# Patient Record
Sex: FEMALE | Race: BLACK OR AFRICAN AMERICAN | Employment: FULL TIME | URBAN - METROPOLITAN AREA
[De-identification: names, ages, dates, MRNs, and addresses within clinical notes are randomized per-mention and may not be internally consistent; named-entity substitution may affect disease eponyms.]

---

## 2017-03-29 ENCOUNTER — HOSPITAL ENCOUNTER (OUTPATIENT)
Dept: LAB | Age: 42
Discharge: HOME | End: 2017-03-29
Payer: COMMERCIAL

## 2017-03-29 ENCOUNTER — OFFICE VISIT (OUTPATIENT)
Dept: FAMILY MEDICINE CLINIC | Age: 42
End: 2017-03-29

## 2017-03-29 VITALS
HEART RATE: 74 BPM | WEIGHT: 293 LBS | BODY MASS INDEX: 43.4 KG/M2 | SYSTOLIC BLOOD PRESSURE: 118 MMHG | RESPIRATION RATE: 17 BRPM | HEIGHT: 69 IN | TEMPERATURE: 98.7 F | DIASTOLIC BLOOD PRESSURE: 75 MMHG | OXYGEN SATURATION: 95 %

## 2017-03-29 DIAGNOSIS — Z97.5 IUD (INTRAUTERINE DEVICE) IN PLACE: ICD-10-CM

## 2017-03-29 DIAGNOSIS — D21.9 LEIOMYOMA: ICD-10-CM

## 2017-03-29 DIAGNOSIS — Z01.419 WELL WOMAN EXAM WITH ROUTINE GYNECOLOGICAL EXAM: ICD-10-CM

## 2017-03-29 DIAGNOSIS — Z01.419 WELL WOMAN EXAM WITH ROUTINE GYNECOLOGICAL EXAM: Primary | ICD-10-CM

## 2017-03-29 PROCEDURE — 88175 CYTOPATH C/V AUTO FLUID REDO: CPT | Performed by: FAMILY MEDICINE

## 2017-03-29 PROCEDURE — 87624 HPV HI-RISK TYP POOLED RSLT: CPT | Performed by: FAMILY MEDICINE

## 2017-03-29 NOTE — MR AVS SNAPSHOT
Visit Information Date & Time Provider Department Dept. Phone Encounter #  
 3/29/2017  2:20 PM Alyssa Chaudhry Swengel [phone] [id number] Upcoming Health Maintenance Date Due DTaP/Tdap/Td series (1 - Tdap) 9/18/1998 INFLUENZA AGE 9 TO ADULT 8/1/2016 PAP AKA CERVICAL CYTOLOGY 7/14/2018 Allergies as of 3/29/2017  Review Complete On: 3/29/2017 By: Pat Plaza LPN No Known Allergies Current Immunizations  Reviewed on 1/16/2013 Name Date Influenza Vaccine PF 10/30/2012 Not reviewed this visit You Were Diagnosed With   
  
 Codes Comments Well woman exam with routine gynecological exam    -  Primary ICD-10-CM: X89.753 ICD-9-CM: V72.31   
 IUD (intrauterine device) in place     ICD-10-CM: Z97.5 ICD-9-CM: V45.51 Leiomyoma     ICD-10-CM: D21.9 ICD-9-CM: 215.9 Vitals BP Pulse Temp Resp Height(growth percentile) Weight(growth percentile) 118/75 (BP 1 Location: Right arm, BP Patient Position: Sitting) 74 98.7 °F (37.1 °C) (Oral) 17 5' 8.5\" (1.74 m) 344 lb (156 kg) LMP SpO2 BMI OB Status Smoking Status 03/26/2017 95% 51.54 kg/m2 Having regular periods Never Smoker Vitals History BMI and BSA Data Body Mass Index Body Surface Area 51.54 kg/m 2 2.75 m 2 Preferred Pharmacy Pharmacy Name Phone [facility]Y #[id number]- [place], [address] ([address]) [phone] Your Updated Medication List  
  
   
This list is accurate as of: 3/29/17  3:34 PM.  Always use your most recent med list.  
  
  
  
  
 DAILY MULTI-VITAMINS/IRON tablet Generic drug:  multivitamin with iron Take 1 Tab by mouth daily. levonorgestrel 20 mcg/24 hr (5 years) IUD Commonly known as:  MIRENA  
1 Each by IntraUTERine route once. We Performed the Following REFERRAL TO GYNECOLOGY [REF30 Custom] Comments: Please evaluate patient for fibroid uterus, IUD retention Referral Information Referral ID Referred By Referred To  
  
 [id number] Fortino Joel MD   
   [address] Phone: [phone] Fax: [phone] Visits Status Start Date End Date 1 New Request 3/29/17 3/29/18 If your referral has a status of pending review or denied, additional information will be sent to support the outcome of this decision. Introducing \[facility] [place] [facility]\"" & HEALTH SERVICES! Foziapreethi Priest introduces [facility] patient portal. Now you can access parts of your medical record, email your doctor's office, and request medication refills online. 1. In your internet browser, go to [email or web address]://[facility]. [employer]/[facility] 2. Click on the First Time User? Click Here link in the Sign In box. You will see the New Member Sign Up page. 3. Enter your [facility] Access Code exactly as it appears below. You will not need to use this code after youve completed the sign-up process. If you do not sign up before the expiration date, you must request a new code. · [facility] Access Code: [email or web address] Expires: 6/27/2017  3:34 PM 
 
4. Enter the last four digits of your Social Security Number (xxxx) and Date of Birth (mm/dd/yyyy) as indicated and click Submit. You will be taken to the next sign-up page. 5. Create a [employer] ID. This will be your [facility] login ID and cannot be changed, so think of one that is secure and easy to remember. 6. Create a [facility] password. You can change your password at any time. 7. Enter your Password Reset Question and Answer. This can be used at a later time if you forget your password. 8. Enter your e-mail address. You will receive e-mail notification when new information is available in [address] 9. Click Sign Up. You can now view and download portions of your medical record. 10. Click the Download Summary menu link to download a portable copy of your medical information. If you have questions, please visit the Frequently Asked Questions section of the [facility] website. Remember, [facility] is NOT to be used for urgent needs. For medical emergencies, dial 911. Now available from your iPhone and Android! Please provide this summary of care documentation to your next provider. Your primary care clinician is listed as [address] [place] [address]. If you have any questions after today's visit, please call [phone].

## 2017-03-29 NOTE — LETTER
4/6/2017 9:50 AM 
 
Ms. [address] [facility] [address] [address] Dear Jose Roberto Ramírez: 
 
Please find your most recent results below. Resulted Orders CX-VAG CYTOLOGY Narrative [address]  [place] 
[facility]      [address]         [address] [facility], [facility] [address]      [place], [address]     Willy Corrigan, [address] [facility] 
[phone] Fax# [phone]    Fax# [phone]      Fax# [phone] 
 
========================================================================== 
                       * * * CYTOPATHOLOGY REPORT* * *   
========================================================================== 
GYNECOLOGICAL * * *Procedures/Addenda Present * * * Patient:  Joseph Noel             Specimen #:  [id number] Age:  1975 (Age: 46)                Date of Procedure: 3/29/2017 Sex:  F                                  Date of Receipt:  3/31/2017 Hospital#:  [id number]               Date of Report: 4/5/2017 Med. Record #:  [id number] Location:  [facility]) Physician(s):  Kayla Leavitt MD 
 
   
 
 
 
ADDITIONAL PATIENT INFORMATION:  
RFX 12 , 25 / 39 HPV REGARDLESS:  
YES  
SOURCE: 
A: Cervical/Endocervical Imaged Processed Thin Prep 
 
 
============================================================================ 
                                * * * FINAL INTERPRETATION* * * Cervical/Endocervical Imaged Processed Thin Prep Satisfactory for evaluation. NEGATIVE FOR INTRAEPITHELIAL LESION OR MALIGNANCY. Note:  Due to technical imaging issues or the physical properties of the 
slide/specimen, comprehensive manual rescreening was performed. NICK Ferraro Junior (AS [place] HPV HR Interpretation Test: HPV, high-risk Result: NEGATIVE Reference Interval: Negative This high-risk HPV test detects fourteen high-risk types 
(16/18/31/33/35/39/45/51/52/56/58/59/66/68) without differentiation, using 
nucleic acid amplification method. Test performed by: [address] [place] [address] [place]  Dr. Belgica Alexandre [facility], Laurence Marcelino [facility] Phone number:  [phone] Ricci Russell * * *Electronically Signed* * * 
4/5/2017 ICD10 Codes: 
 O53.774 The Pap test is a screening procedure to aid in the detection of cervical 
cancer and its precursors. It should not be used as the sole means of 
detecting cervical cancer. As with any laboratory test, false negative 
and false positive results are known to occur. RECOMMENDATIONS: 
None. Keep up the good work! Negative PAP smear Negative HPV HR  
    
   
 
 
 
Please call me if you have any questions: [phone] Sincerely, 
Anusha Moore

## 2017-03-29 NOTE — PROGRESS NOTES
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 Lydia Kearney for ultrasound and ? Office removal of IUD    Subjective:   46 y.o. female for Well Woman Check. Patient's last menstrual period was 03/26/2017. Social History: not sexually active. Pertinent past medical hstory:   Patient Active Problem List   Diagnosis Code    BMI 50.0-59.9, adult ([facility] L82.84    IUD (intrauterine device) in place Z97.5     Current Outpatient Prescriptions   Medication Sig Dispense Refill    levonorgestrel (MIRENA) 20 mcg/24 hr (5 years) IUD 1 Each by IntraUTERine route once.  multivitamin with iron (DAILY MULTI-VITAMINS/IRON) tablet Take 1 Tab by mouth daily. No Known Allergies     ROS:  Feeling well. No dyspnea or chest pain on exertion. No abdominal pain, change in bowel habits, black or bloody stools. No urinary tract symptoms. GYN ROS: normal menses, no abnormal bleeding, pelvic pain or discharge, no breast pain or new or enlarging lumps on self exam. No neurological complaints. Objective:     Visit Vitals    /75 (BP 1 Location: Right arm, BP Patient Position: Sitting)    Pulse 74    Temp 98.7 °F (37.1 °C) (Oral)    Resp 17    Ht 5' 8.5\" (1.74 m)    Wt 344 lb (156 kg)    LMP 03/26/2017    SpO2 95%    BMI 51.54 kg/m2     The patient appears well, alert, oriented x 3, in no distress. ENT normal.  Neck supple. No adenopathy or thyromegaly. TIM.  Lungs are clear, good air entry, no wheezes, rhonchi or rales. S1 and S2 normal, no murmurs, regular rate and rhythm. Abdomen soft without tenderness, guarding, mass or organomegaly. Extremities show no edema, normal peripheral pulses. Neurological is normal, no focal findings. BREAST EXAM: breasts appear normal, no suspicious masses, no skin or nipple changes or axillary nodes    PELVIC EXAM: normal external genitalia, vulva, vagina, cervix, uterus and adnexa, exam limited by body habitus, IUD strings visible at cervical os but with gentle traction unable to remove IUD    Assessment/Plan:   well woman  pap smear  return annually or prn    ICD-10-CM ICD-9-CM    1. Well woman exam with routine gynecological exam C03.786 P35.41 METABOLIC PANEL, COMPREHENSIVE      LIPID PANEL      CBC W/O DIFF      TSH 3RD GENERATION      PAP IG, APTIMA HPV AND RFX 16/18,45 ([id number])   2. IUD (intrauterine device) in place Z97.5 V45.51 REFERRAL TO GYNECOLOGY   3. Leiomyoma D21.9 215.9 REFERRAL TO GYNECOLOGY   4. Well woman exam with routine gynecological exam X96.227 X55.48 METABOLIC PANEL, COMPREHENSIVE      LIPID PANEL      CBC W/O DIFF      TSH 3RD GENERATION      PAP IG, APTIMA HPV AND RFX 16/18,45 ([id number])    [V72.31]   .

## 2017-05-16 ENCOUNTER — OFFICE VISIT (OUTPATIENT)
Dept: MIDWIFE SERVICES | Age: 42
End: 2017-05-16

## 2017-05-16 VITALS
SYSTOLIC BLOOD PRESSURE: 136 MMHG | WEIGHT: 293 LBS | HEIGHT: 69 IN | HEART RATE: 79 BPM | DIASTOLIC BLOOD PRESSURE: 95 MMHG | BODY MASS INDEX: 43.4 KG/M2

## 2017-05-16 DIAGNOSIS — D25.1 FIBROIDS, INTRAMURAL: Primary | ICD-10-CM

## 2017-05-16 NOTE — PROGRESS NOTES
Chief Complaint   Patient presents with    Ultrasound     for fibroid evaluation    Other     look for \"stuck\" IUD     Visit Vitals    BP (!) 136/95    Pulse 79    Ht 5' 8.5\" (1.74 m)    Wt 337 lb (152.9 kg)    BMI 50.5 kg/m2     1. Have you been to the ER, urgent care clinic since your last visit? Hospitalized since your last visit? No    2. Have you seen or consulted any other health care providers outside of the [address] [place] [address] since your last visit? Include any pap smears or colon screening. No     Here today for ultrasound to check fibroids and also see if IUD can be removed. Dr. Rei Thorne was unable to remove it.

## 2017-05-27 NOTE — PROGRESS NOTES
Gyn Report [facility] Page 1/ [facility]  Patient / Exam Information Date of Exam: 2017  Name: Lianet Noe. Phys: Sinan ROMAN  Patient ID: [id number] : 1975 Ref. Phys:  2nd Pat. ID: Age: 46 Sonographer: Sinan ROMAN  Indication: Sex: Female Exam Type: FIBROID EVALUATION  LMP  LMP Day of Cycle  AB  Day of stim. Expected Ovul. Para Ectopic  2D Measurements Value m1 m2 m3 m4 m5 m6 Meth. Uterus  Length 6.20 cm 6.20 avg. Width 7.11 cm 7.11 avg. Height 5.34 cm 5.34 avg. Volume 123.254 cm³ 123.254  Cervix Length 1.88 cm 1.88 avg. Left Ovary  Length 2.21 cm 2.21 avg. Height 2.12 cm 2.12 avg. Right Ovary  Length 2.29 cm 2.29 avg. Height 1.48 cm 1.48 avg. Comment  IMPRESSION  Slightly enlarged fibroid uterus  Recommendation  Follow up as clinically indicated  Date: 2017 Perf.  Physician: Sinan ROMAN Sonographer: Sinan ROMAN

## 2019-04-02 ENCOUNTER — OFFICE VISIT (OUTPATIENT)
Dept: FAMILY MEDICINE CLINIC | Age: 44
End: 2019-04-02

## 2019-04-02 VITALS
TEMPERATURE: 98.1 F | SYSTOLIC BLOOD PRESSURE: 136 MMHG | WEIGHT: 293 LBS | RESPIRATION RATE: 16 BRPM | HEART RATE: 85 BPM | HEIGHT: 69 IN | OXYGEN SATURATION: 96 % | BODY MASS INDEX: 43.4 KG/M2 | DIASTOLIC BLOOD PRESSURE: 87 MMHG

## 2019-04-02 DIAGNOSIS — Z00.00 WELL WOMAN EXAM (NO GYNECOLOGICAL EXAM): Primary | ICD-10-CM

## 2019-04-02 DIAGNOSIS — F41.9 ANXIETY AND DEPRESSION: ICD-10-CM

## 2019-04-02 DIAGNOSIS — F32.A ANXIETY AND DEPRESSION: ICD-10-CM

## 2019-04-02 RX ORDER — ESCITALOPRAM OXALATE 10 MG/1
10 TABLET ORAL DAILY
Qty: 30 TAB | Refills: 1 | Status: SHIPPED | OUTPATIENT
Start: 2019-04-02 | End: 2019-06-20 | Stop reason: SDUPTHER

## 2019-04-02 NOTE — PATIENT INSTRUCTIONS
[address] [facility], [facility]  [phone]    [address] Office  Phone: [phone]  FAX: [phone]  [address] [facility], [facility] 23    [facility]  Phone: [phone]  FAX: [phone]  [address] [facility], [address]  [address] [facility] Office  [phone]  FAX: [phone], [address] [address]    [address]  [phone]: [address] [facility], 295 Critical access hospital, [facility] [address]    [employer] [facility] [address]  [address], [address]   [phone]    The [address] [facility] Office  [address] [facility], [address]  [phone]    [facility] Office  [address], [address] [facility]  [phone]    [facility]  [place] [facility])  [phone]    [facility] Location  [address], [facility]    [address] [facility] and [place] [address] [address] [place] [address], [address] [place] [address], [address]  [phone]    A[address] Agustin (for residents of [facility])  [address] Address  [facility] [address]  Phone Numbers  [phone]   TDD [phone]   FAX [phone]  Crisis Intervention  24 Hours/Day   [phone]     [facility] (for residents of [facility], and [facility])  Referral/Intake Services [phone]   Emergency Services (24 hrs 365 days) [phone]   (all clinics during business hours & [place] location for after hours)       Instructed patient to contact office or on-call physician promptly should condition worsen or any new symptoms appear and provided on-call telephone numbers.   IF THE PATIENT HAS ANY SUICIDAL OR HOMICIDAL IDEATION, CALL THE OFFICE, DISCUSS WITH A SUPPORT MEMBER OR GO TO THE ER IMMEDIATELY- pt said they would. Escitalopram (By mouth)   Escitalopram (pu-qmi-WOJ-oh-pram)  Treats depression and generalized anxiety disorder (DARRON). Brand Name(s): Lexapro   There may be other brand names for this medicine. When This Medicine Should Not Be Used: This medicine is not right for everyone. Do not use it if you had an allergic reaction to escitalopram or citalopram.  How to Use This Medicine:   Liquid, Tablet  · Take this medicine as directed. You may need to take it for a month or more before you feel better. Your dose may need to be changed to find out what works best for you. · Measure the oral liquid medicine with a marked measuring spoon, oral syringe, or medicine cup. · This medicine should come with a Medication Guide. Ask your pharmacist for a copy if you do not have one. · Missed dose: Take a dose as soon as you remember. If it is almost time for your next dose, wait until then and take a regular dose. Do not take extra medicine to make up for a missed dose. · Store the medicine in a closed container at room temperature, away from heat, moisture, and direct light. Drugs and Foods to Avoid:   Ask your doctor or pharmacist before using any other medicine, including over-the-counter medicines, vitamins, and herbal products. · Do not use this medicine together with pimozide. Do not use this medicine and an MAO inhibitor (MAOI) within 14 days of each other. · Some medicines can affect how escitalopram works.  Tell your doctor if you are using the following:   ¨ Buspirone, carbamazepine, fentanyl, lithium, Kirsten's wort, tramadol, or tryptophan supplements  ¨ Amphetamines  ¨ Blood thinner (including warfarin)  ¨ Diuretic (water pill)  ¨ NSAID pain or arthritis medicine (including aspirin, celecoxib, diclofenac, ibuprofen, naproxen)  ¨ Triptan medicine to treat migraine headaches (including sumatriptan)  · Tell your doctor if you use anything else that makes you sleepy. Some examples are allergy medicine, narcotic pain medicine, and alcohol. · Do not drink alcohol while you are using this medicine. Warnings While Using This Medicine:   · Tell your doctor if you are pregnant or breastfeeding, or if you have kidney disease, liver disease, bleeding problems, glaucoma, heart disease, or a seizure disorder. · For some children, teenagers, and young adults, this medicine may increase mental or emotional problems. This may lead to thoughts of suicide and violence. Talk with your doctor right away if you have any thoughts or behavior changes that concern you. Tell your doctor if you or anyone in your family has a history of bipolar disorder or suicide attempts. · This medicine may cause the following problems:   ¨ Serotonin syndrome (more likely when taken with certain medicines)  ¨ Low sodium levels  ¨ Increased risk of bleeding problems  · This medicine may make you dizzy or drowsy. Do not drive or do anything that could be dangerous until you know how this medicine affects you. · Your doctor may want to monitor your child's weight and height, because this medicine may cause decreased appetite and weight loss in children. · Do not stop using this medicine suddenly. Your doctor will need to slowly decrease your dose before you stop it completely. · Your doctor will check your progress and the effects of this medicine at regular visits. Keep all appointments. · Keep all medicine out of the reach of children. Never share your medicine with anyone.   Possible Side Effects While Using This Medicine:   Call your doctor right away if you notice any of these side effects:  · Allergic reaction: Itching or hives, swelling in your face or hands, swelling or tingling in your mouth or throat, chest tightness, trouble breathing  · Anxiety, restlessness, fever, sweating, muscle spasms, nausea, vomiting, diarrhea, seeing or hearing things that are not there  · Confusion, weakness, and muscle twitching  · Eye pain, vision changes, seeing halos around lights  · Fast, pounding, or uneven heartbeat  · Feeling more excited or energetic than usual, racing thoughts, trouble sleeping  · Seizures  · Thoughts of hurting yourself or others, unusual behavior  · Unusual bleeding or bruising  If you notice these less serious side effects, talk with your doctor:   · Dizziness, drowsiness, or sleepiness  · Dry mouth  · Headache  · Nausea, constipation, diarrhea  · Sexual problems  If you notice other side effects that you think are caused by this medicine, tell your doctor. Call your doctor for medical advice about side effects. You may report side effects to FDA at 6-754-FDA-5633  © 2017 [address] Information is for End User's use only and may not be sold, redistributed or otherwise used for commercial purposes. The above information is an  only. It is not intended as medical advice for individual conditions or treatments. Talk to your doctor, nurse or pharmacist before following any medical regimen to see if it is safe and effective for you.

## 2019-04-02 NOTE — PROGRESS NOTES
Subjective:   41 y.o. female for Well Woman Check. PMH: Obesity, fibroids   Medications: multivitamin   Allergies: NKDA  Surgical Hx:    Family Hx: Mom and dad with HTN and DM   OB-Gyn Hx:   LMP 3/15/19. Last 5 days. Regular, no issues. . Lifestyle:   Diet: Just started plant based    Exercise: Not exercising   Tobacco: None  Alcohol: Social  Drugs: Marijuana 4 or 5 times a year   Sexual Activity: Not currently   Occupation: Teacher     Vaccines:   -TDAP: Done last year   -Shingrix: Not indicated  -PCV 13 and or PPSV 23: Not indicated  -Influenza: Not indicated    Health Maintenance:  Pap smear: 2017 cytology and HPV negative  Mammogram: Done today   Colonoscopy: Not indicated  DEXA scan:  Not indicated  HIV testing:  Desires STD   Hepatitis C testing: Not indicated  Lung cancer screening: Not indicated  Vision screening: Yearly  Dental screening: Yearly     Issues today:   Anxiety and depression: Felt like this most of her life but states she is very high functioning until recently. Sleeping more. Single parent and also taking care of both her parents who are ill. Feels like she neglects herself and difficult to motivate herself. Supportive family. Enjoys work. Has tried [address] in the past which was minimally helpful. Denies SI/HI. DARRON 7: 18   PHQ 9: 22, denies SI/HI. Review of Systems  Constitutional: negative for fatigue and malaise  Respiratory: negative for shortness of breath  Cardiovascular: negative for chest pain  Gastrointestinal: negative for abdominal pain  Neurological: negative for dizziness/headache    Objective:   Blood pressure 136/87, pulse 85, temperature 98.1 °F (36.7 °C), temperature source Oral, resp. rate 16, height 5' 8.5\" (1.74 m), weight (!) 355 lb (161 kg), SpO2 96 %.    Physical Examination:   General appearance - alert, well appearing, and in no distress, obese   Mental status - alert, oriented to person, place, and time  Eyes - pupils equal and reactive, extraocular eye movements intact  Ears - bilateral TM's and external ear canals normal  Nose - normal and patent, no erythema, discharge or polyps  Mouth - mucous membranes moist, pharynx normal without lesions  Neck - supple, no significant adenopathy  Lymphatics - no palpable lymphadenopathy, no hepatosplenomegaly  Chest - clear to auscultation, no wheezes, rales or rhonchi, symmetric air entry  Heart - normal rate, regular rhythm, normal S1, S2, no murmurs, rubs, clicks or gallops  Abdomen - soft, nontender, nondistended, no masses or organomegaly  Back exam - full range of motion, no tenderness, palpable spasm or pain on motion  Neurological - alert, oriented, normal speech, no focal findings or movement disorder noted  Musculoskeletal - no joint tenderness, deformity or swelling  Extremities - peripheral pulses normal, no pedal edema, no clubbing or cyanosis  Skin - normal coloration and turgor, no rashes, no suspicious skin lesions noted   Psych - pleasant, conversant, maintaining eye contact, good insight     Assessment/Plan:   41year old female here for well woman exam   -Encouraged healthy lifestyle through diet and exercise  -Recommended refraining from ETOH and tobacco use   -Anxiety and Depression: Will initiate therapy with Lexapro 10mg and patient also open to counseling. References provided in AVS. Suicide precautions provided. F/U in 4 weeks. ICD-10-CM ICD-9-CM    1. Well woman exam (no gynecological exam) Z00.00 V70.0 CBC W/O DIFF      LIPID PANEL      METABOLIC PANEL, COMPREHENSIVE      HEMOGLOBIN A1C WITH EAG      MARY MAMMO BI SCREENING INCL CAD      HIV 1/2 AG/AB, 4TH GENERATION,W RFLX CONFIRM      CHLAMYDIA/GC PCR      RPR      HEP B SURFACE AG   2.  Anxiety and depression F41.9 300.00 escitalopram oxalate (LEXAPRO) 10 mg tablet    F32.9 311 TSH RFX ON ABNORMAL TO FREE T4      VITAMIN D, 25 HYDROXY      VITAMIN B12      FOLATE     Follow-up and Dispositions    · Return in about 1 month (around 4/30/2019) for follow up.        James Betancourt MD

## 2019-04-02 NOTE — PROGRESS NOTES
Chief Complaint   Patient presents with    Complete Physical       Visit Vitals  /87 (BP 1 Location: Left arm, BP Patient Position: Sitting)   Pulse 85   Temp 98.1 °F (36.7 °C) (Oral)   Resp 16   Ht 5' 8.5\" (1.74 m)   Wt (!) 355 lb (161 kg)   SpO2 96%   BMI 53.19 kg/m²

## 2019-04-02 NOTE — PROGRESS NOTES
41year old female here for annual exam    BMI = 53    Has elderly sick parents, has 6year old child, single parent    Started her on lexapro     Pt will followup in 4 weeks    I reviewed with the resident the medical history and the resident's findings on the physical examination. I discussed with the resident the patient's diagnosis and concur with the plan.

## 2019-04-03 LAB
ALBUMIN SERPL-MCNC: 3.7 G/DL (ref 3.5–5.5)
ALBUMIN/GLOB SERPL: 1.4 {RATIO} (ref 1.2–2.2)
ALP SERPL-CCNC: 54 IU/L (ref 39–117)
ALT SERPL-CCNC: 9 IU/L (ref 0–32)
AST SERPL-CCNC: 11 IU/L (ref 0–40)
BILIRUB SERPL-MCNC: <0.2 MG/DL (ref 0–1.2)
BUN SERPL-MCNC: 8 MG/DL (ref 6–24)
BUN/CREAT SERPL: 9 (ref 9–23)
CALCIUM SERPL-MCNC: 8.2 MG/DL (ref 8.7–10.2)
CHLORIDE SERPL-SCNC: 109 MMOL/L (ref 96–106)
CHOLEST SERPL-MCNC: 134 MG/DL (ref 100–199)
CO2 SERPL-SCNC: 17 MMOL/L (ref 20–29)
CREAT SERPL-MCNC: 0.9 MG/DL (ref 0.57–1)
ERYTHROCYTE [DISTWIDTH] IN BLOOD BY AUTOMATED COUNT: 16.1 % (ref 12.3–15.4)
EST. AVERAGE GLUCOSE BLD GHB EST-MCNC: 123 MG/DL
GLOBULIN SER CALC-MCNC: 2.7 G/DL (ref 1.5–4.5)
GLUCOSE SERPL-MCNC: 97 MG/DL (ref 65–99)
HBA1C MFR BLD: 5.9 % (ref 4.8–5.6)
HCT VFR BLD AUTO: 31.3 % (ref 34–46.6)
HDLC SERPL-MCNC: 42 MG/DL
HGB BLD-MCNC: 9.9 G/DL (ref 11.1–15.9)
INTERPRETATION, 910389: NORMAL
LDLC SERPL CALC-MCNC: 84 MG/DL (ref 0–99)
MCH RBC QN AUTO: 26.1 PG (ref 26.6–33)
MCHC RBC AUTO-ENTMCNC: 31.6 G/DL (ref 31.5–35.7)
MCV RBC AUTO: 82 FL (ref 79–97)
PLATELET # BLD AUTO: 286 X10E3/UL (ref 150–379)
POTASSIUM SERPL-SCNC: 4.3 MMOL/L (ref 3.5–5.2)
PROT SERPL-MCNC: 6.4 G/DL (ref 6–8.5)
RBC # BLD AUTO: 3.8 X10E6/UL (ref 3.77–5.28)
SODIUM SERPL-SCNC: 143 MMOL/L (ref 134–144)
TRIGL SERPL-MCNC: 38 MG/DL (ref 0–149)
VLDLC SERPL CALC-MCNC: 8 MG/DL (ref 5–40)
WBC # BLD AUTO: 7.6 X10E3/UL (ref 3.4–10.8)

## 2019-04-09 DIAGNOSIS — D64.9 ANEMIA, UNSPECIFIED TYPE: Primary | ICD-10-CM

## 2019-04-09 RX ORDER — FERROUS SULFATE 325(65) MG
325 TABLET, DELAYED RELEASE (ENTERIC COATED) ORAL 2 TIMES DAILY
Qty: 60 TAB | Refills: 1 | Status: SHIPPED | OUTPATIENT
Start: 2019-04-09

## 2019-04-09 NOTE — PROGRESS NOTES
CBC with anemia, will send script for ferrous sulfate 325mg BID to pharmacy. Lipid panel WNL, no indication for statin at this time. CMP unremarkable. A1C in prediabetic range.

## 2019-06-20 DIAGNOSIS — F41.9 ANXIETY AND DEPRESSION: ICD-10-CM

## 2019-06-20 DIAGNOSIS — F32.A ANXIETY AND DEPRESSION: ICD-10-CM

## 2019-06-20 RX ORDER — ESCITALOPRAM OXALATE 10 MG/1
10 TABLET ORAL DAILY
Qty: 30 TAB | Refills: 1 | Status: SHIPPED | OUTPATIENT
Start: 2019-06-20